# Patient Record
Sex: FEMALE | Race: WHITE | NOT HISPANIC OR LATINO | URBAN - METROPOLITAN AREA
[De-identification: names, ages, dates, MRNs, and addresses within clinical notes are randomized per-mention and may not be internally consistent; named-entity substitution may affect disease eponyms.]

---

## 2022-05-18 ENCOUNTER — EMERGENCY (EMERGENCY)
Facility: HOSPITAL | Age: 42
LOS: 0 days | Discharge: HOME | End: 2022-05-18
Attending: EMERGENCY MEDICINE | Admitting: EMERGENCY MEDICINE
Payer: OTHER MISCELLANEOUS

## 2022-05-18 VITALS
SYSTOLIC BLOOD PRESSURE: 144 MMHG | OXYGEN SATURATION: 99 % | WEIGHT: 179.9 LBS | TEMPERATURE: 98 F | RESPIRATION RATE: 20 BRPM | DIASTOLIC BLOOD PRESSURE: 80 MMHG | HEART RATE: 110 BPM

## 2022-05-18 VITALS
SYSTOLIC BLOOD PRESSURE: 123 MMHG | OXYGEN SATURATION: 99 % | RESPIRATION RATE: 18 BRPM | DIASTOLIC BLOOD PRESSURE: 78 MMHG | HEART RATE: 102 BPM

## 2022-05-18 DIAGNOSIS — M25.532 PAIN IN LEFT WRIST: ICD-10-CM
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
## 2022-05-18 DIAGNOSIS — S02.5XXA FRACTURE OF TOOTH (TRAUMATIC), INITIAL ENCOUNTER FOR CLOSED FRACTURE: ICD-10-CM

## 2022-05-18 DIAGNOSIS — M25.542 PAIN IN JOINTS OF LEFT HAND: ICD-10-CM
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
## 2022-05-18 DIAGNOSIS — S62.317A DISPLACED FRACTURE OF BASE OF FIFTH METACARPAL BONE, LEFT HAND, INITIAL ENCOUNTER FOR CLOSED FRACTURE: ICD-10-CM

## 2022-05-18 DIAGNOSIS — Z88.0 ALLERGY STATUS TO PENICILLIN: ICD-10-CM

## 2022-05-18 PROCEDURE — 99284 EMERGENCY DEPT VISIT MOD MDM: CPT | Mod: 25

## 2022-05-18 PROCEDURE — 73130 X-RAY EXAM OF HAND: CPT | Mod: 26,LT

## 2022-05-18 PROCEDURE — 73110 X-RAY EXAM OF WRIST: CPT | Mod: 26,LT

## 2022-05-18 PROCEDURE — 29125 APPL SHORT ARM SPLINT STATIC: CPT

## 2022-05-18 RX ORDER — KETOROLAC TROMETHAMINE 30 MG/ML
30 SYRINGE (ML) INJECTION ONCE
Refills: 0 | Status: DISCONTINUED | OUTPATIENT
Start: 2022-05-18 | End: 2022-05-18

## 2022-05-18 RX ADMIN — Medication 30 MILLIGRAM(S): at 14:22

## 2022-05-18 NOTE — ED PROVIDER NOTE - PHYSICAL EXAMINATION
Physical Exam    Constitutional: No acute distress.   Eyes: Conjunctiva pink, Sclera clear, PERRLA, EOMI.  ENT: No sinus tenderness. No nasal discharge. Dental fractures of teeth #8 and #9  Cardiovascular: Regular rate, regular rhythm. No noted murmurs rubs or gallops.  Respiratory: unlabored respiratory effort, clear to auscultation bilaterally no wheezing, rales or rhonchi  Gastrointestinal: Normal bowel sounds. soft, non distended, non-tender abdomen.   Musculoskeletal: supple neck, no midline tenderness. No head injury. Left hand with ecchymosis and tenderness of base of 3rd-5th metacarpal. Difficulty removing rings on left hand due to edema.  Integumentary: warm, dry, no rash  Neurologic: awake, alert, oriented x 3

## 2022-05-18 NOTE — ED PROVIDER NOTE - CARE PROVIDER_API CALL
Ronaldo Orozco)  Orthopaedic Surgery; Sports Medicine  [address] [place] [address]  Phone: [phone]  Fax: [phone]  Follow Up Time:     Your dentist,   Phone: (   )    -  Fax: (   )    -  Follow Up Time:

## 2022-05-18 NOTE — ED PROVIDER NOTE - PROGRESS NOTE DETAILS
RAJEEV Harley: Able to remove engagement ring with manual manipulation and lubricant. Used ring cutter to cut wedding band off of ring finger. No skin damage or cyanosis

## 2022-05-18 NOTE — ED PROVIDER NOTE - PATIENT PORTAL LINK FT
You can access the FollowMyHealth Patient Portal offered by [facility] by registering at the following website: [email or web address]. By joining [employer]’s FollowMyHealth portal, you will also be able to view your health information using other applications (apps) compatible with our system.

## 2022-05-18 NOTE — ED PROVIDER NOTE - NS ED ROS FT
Constitutional: (-) fever  Eyes/ENT: (-) blurry vision, (-) epistaxis (+) dental fractures  Cardiovascular: (-) chest pain, (-) syncope  Respiratory: (-) cough, (-) shortness of breath  Gastrointestinal: (-) vomiting, (-) diarrhea  Musculoskeletal: (-) neck pain, (-) back pain, (+) left wrist/hand pain, swelling, ecchymosis  Integumentary: (-) rash, (-) edema  Neurological: (-) headache, (-) altered mental status

## 2022-05-18 NOTE — ED PROVIDER NOTE - ATTENDING APP SHARED VISIT CONTRIBUTION OF CARE
42-year-old female presents for evaluation status post fall while at work.  Patient tripped and  fell at work.  Patient complains of pain and swelling to left wrist as well as cracked 2 front teeth.  No LOC, no nausea or vomiting.  On exam patient in NAD, AAO x3, GCS 15 + fx to front upper central incisors, no rocking of teeth, no malocclusion, no midline vertebral tenderness, positive swelling to the left hand medial aspect over fourth and fifth metacarpals, positive pain with movement, positive tenderness, positive distal pulses.

## 2022-05-18 NOTE — ED PROVIDER NOTE - CLINICAL SUMMARY MEDICAL DECISION MAKING FREE TEXT BOX
Pain treated.  Ice packs were given.  X-rays were obtained and reveal fracture of fifth metacarpal.  Images reviewed in detail with patient and spouse.  Patient placed in splint and advised follow-up with orthopedics.  Patient plans to see Dr. perales of ortho.  Patient is leaving the ED and going to her dentist today.

## 2022-05-18 NOTE — ED PROVIDER NOTE - OBJECTIVE STATEMENT
42 year old female with no significant medical history presents to the ED s/p fall with left wrist/hand pain and fractured teeth. While walking outside during work patient tripped on the pavement causing her to fall forward onto her left wrist and face. She has fractured her two front teeth but did not hit her the top of her head or lose consciousness. She is unsure of the exact mechanism of injury of the left hand however she is points to pain and swelling near the left 3rd-5th metacarpal as well as the medial wrist. She is 42 year old female with no significant medical history presents to the ED s/p mechanical fall with left wrist/hand pain and fractured teeth. While walking outside during work patient tripped on the pavement causing her to fall forward onto her left wrist and face. She has fractured her two front teeth but did not hit her the top of her head or lose consciousness. Denies dental pain and gingival bleeding. She is unsure of the exact mechanism of injury of the left hand however she is points to pain and swelling near the left 3rd-5th metacarpal as well as the medial wrist. She reports that she cannot get her wedding wrist off of her hand due to swelling. Denies chest pain, shortness of breath, syncope, rib pain, other msk injury. Not taking AC.

## 2022-05-18 NOTE — ED PROVIDER NOTE - CARE PLAN
1 Principal Discharge DX:	Fracture of metacarpal  Secondary Diagnosis:	Dental injury  Secondary Diagnosis:	Fall

## 2022-05-18 NOTE — ED PROVIDER NOTE - NS ED ATTENDING STATEMENT MOD
This was a shared visit with the SOLO. I reviewed and verified the documentation and independently performed the documented:
